# Patient Record
Sex: FEMALE | Race: WHITE | NOT HISPANIC OR LATINO
[De-identification: names, ages, dates, MRNs, and addresses within clinical notes are randomized per-mention and may not be internally consistent; named-entity substitution may affect disease eponyms.]

---

## 2019-02-12 PROBLEM — Z00.00 ENCOUNTER FOR PREVENTIVE HEALTH EXAMINATION: Status: ACTIVE | Noted: 2019-02-12

## 2019-02-22 ENCOUNTER — APPOINTMENT (OUTPATIENT)
Dept: UROGYNECOLOGY | Facility: CLINIC | Age: 74
End: 2019-02-22
Payer: MEDICARE

## 2019-02-22 VITALS
HEIGHT: 61 IN | BODY MASS INDEX: 23.41 KG/M2 | WEIGHT: 124 LBS | DIASTOLIC BLOOD PRESSURE: 78 MMHG | SYSTOLIC BLOOD PRESSURE: 148 MMHG

## 2019-02-22 DIAGNOSIS — R31.29 OTHER MICROSCOPIC HEMATURIA: ICD-10-CM

## 2019-02-22 LAB
BILIRUB UR QL STRIP: NORMAL
CLARITY UR: CLEAR
COLLECTION METHOD: NORMAL
GLUCOSE UR-MCNC: NORMAL
HCG UR QL: 0.2 EU/DL
HGB UR QL STRIP.AUTO: NORMAL
KETONES UR-MCNC: NORMAL
LEUKOCYTE ESTERASE UR QL STRIP: NORMAL
NITRITE UR QL STRIP: NORMAL
PH UR STRIP: 7
PROT UR STRIP-MCNC: NORMAL
SP GR UR STRIP: 1.01

## 2019-02-22 PROCEDURE — 99204 OFFICE O/P NEW MOD 45 MIN: CPT

## 2019-02-22 NOTE — PHYSICAL EXAM
[No Acute Distress] : in no acute distress [Oriented x3] : oriented to person, place, and time [Normal Memory] : ~T memory was ~L unimpaired [Normal Mood/Affect] : mood and affect are normal [Anxiety] : patient is anxious [Warm and Dry] : was warm and dry to touch [Normal Gait] : gait was normal [Vulvar Atrophy] : vulvar atrophy [Labia Majora] : were normal [Labia Minora] : were normal [No Bleeding] : there was no active vaginal bleeding [Aa ____] : Aa [unfilled] [Ba ____] : Ba [unfilled] [Normal] : no abnormalities [C ____] : C [unfilled] [GH ____] : GH [unfilled] [PB ____] : PB [unfilled] [TVL ____] : TVL  [unfilled] [Ap ____] : Ap [unfilled] [Bp ____] : Bp [unfilled] [D ____] : D [unfilled] [Tenderness] : ~T no ~M abdominal tenderness observed [Distended] : not distended [H/Smegaly] : no hepatosplenomegaly [Inguinal LAD] : no adenopathy was noted in the inguinal lymph nodes [FreeTextEntry4] : defect in proximal anterior vaginal wall above, Dr Vera called in to examine patient with me. No enterocele or bowel through defect. No peritoneal defect.

## 2019-02-22 NOTE — DISCUSSION/SUMMARY
[FreeTextEntry1] : \par Computer generated images were used to demonstrate her prolapse as well as explain treatment options. We reviewed management options for her prolapse including: observation, pelvic floor exercises, pessary, and surgical management. She denies bothersome symptoms due to her prolapse and PVR normal. She would like to continue with observation. Will RTO in 1 year for follow up, or sooner if issues arise. All questions answered.

## 2019-02-22 NOTE — HISTORY OF PRESENT ILLNESS
[Vaginal Wall Prolapse] : none [] : years ago [Rectal Prolapse] : none [Unable To Restrain Bowel Movement] : none [Feelings Of Urinary Urgency] : none [Urinary Frequency] : none [Pain During Urination (Dysuria)] : none [Urinary Tract Infection] : none [Constipation Obstructed Defecation] : none [FreeTextEntry5] : no associated pain or discomfort [FreeTextEntry1] : \par h/o abdominal "bladder lift" ~13 years ago, unsure if bhatia procedure.

## 2019-02-22 NOTE — PROCEDURE
[FreeTextEntry1] : Sterile straight catheterization was performed to measure a postvoid residual volume which was 80 cc (15 min after voiding)

## 2019-02-25 ENCOUNTER — RECORD ABSTRACTING (OUTPATIENT)
Age: 74
End: 2019-02-25

## 2019-02-25 DIAGNOSIS — Z81.8 FAMILY HISTORY OF OTHER MENTAL AND BEHAVIORAL DISORDERS: ICD-10-CM

## 2019-02-25 DIAGNOSIS — Z83.3 FAMILY HISTORY OF DIABETES MELLITUS: ICD-10-CM

## 2019-02-25 DIAGNOSIS — Z82.3 FAMILY HISTORY OF STROKE: ICD-10-CM

## 2019-02-25 DIAGNOSIS — I10 ESSENTIAL (PRIMARY) HYPERTENSION: ICD-10-CM

## 2019-02-25 DIAGNOSIS — E07.9 DISORDER OF THYROID, UNSPECIFIED: ICD-10-CM

## 2019-02-25 DIAGNOSIS — Z85.3 PERSONAL HISTORY OF MALIGNANT NEOPLASM OF BREAST: ICD-10-CM

## 2019-02-25 DIAGNOSIS — Z78.9 OTHER SPECIFIED HEALTH STATUS: ICD-10-CM

## 2019-02-25 DIAGNOSIS — Z63.4 DISAPPEARANCE AND DEATH OF FAMILY MEMBER: ICD-10-CM

## 2019-02-25 DIAGNOSIS — Z83.49 FAMILY HISTORY OF OTHER ENDOCRINE, NUTRITIONAL AND METABOLIC DISEASES: ICD-10-CM

## 2019-02-25 RX ORDER — AMLODIPINE BESYLATE 5 MG/1
5 TABLET ORAL
Refills: 0 | Status: ACTIVE | COMMUNITY

## 2019-02-25 RX ORDER — LEVOTHYROXINE SODIUM 75 UG/1
75 TABLET ORAL
Refills: 0 | Status: ACTIVE | COMMUNITY

## 2019-02-25 SDOH — SOCIAL STABILITY - SOCIAL INSECURITY: DISSAPEARANCE AND DEATH OF FAMILY MEMBER: Z63.4

## 2019-02-26 ENCOUNTER — RESULT REVIEW (OUTPATIENT)
Age: 74
End: 2019-02-26

## 2019-03-01 ENCOUNTER — RESULT REVIEW (OUTPATIENT)
Age: 74
End: 2019-03-01

## 2019-03-01 DIAGNOSIS — N39.0 URINARY TRACT INFECTION, SITE NOT SPECIFIED: ICD-10-CM

## 2019-03-01 LAB — BACTERIA UR CULT: ABNORMAL

## 2019-07-14 ENCOUNTER — FORM ENCOUNTER (OUTPATIENT)
Age: 74
End: 2019-07-14

## 2019-07-26 ENCOUNTER — FORM ENCOUNTER (OUTPATIENT)
Age: 74
End: 2019-07-26

## 2019-11-20 LAB
APPEARANCE: CLEAR
BACTERIA: NEGATIVE
BILIRUBIN URINE: NEGATIVE
BLOOD URINE: NEGATIVE
COLOR: NORMAL
GLUCOSE QUALITATIVE U: NEGATIVE
HYALINE CASTS: 0 /LPF
KETONES URINE: NEGATIVE
LEUKOCYTE ESTERASE URINE: NEGATIVE
MICROSCOPIC-UA: NORMAL
NITRITE URINE: NEGATIVE
PH URINE: 7
PROTEIN URINE: NEGATIVE
RED BLOOD CELLS URINE: 1 /HPF
SPECIFIC GRAVITY URINE: 1.01
SQUAMOUS EPITHELIAL CELLS: 2 /HPF
UROBILINOGEN URINE: NORMAL
WHITE BLOOD CELLS URINE: 13 /HPF

## 2020-12-21 PROBLEM — N39.0 ACUTE UTI: Status: RESOLVED | Noted: 2019-03-01 | Resolved: 2020-12-21

## 2021-02-09 ENCOUNTER — APPOINTMENT (OUTPATIENT)
Dept: OTOLARYNGOLOGY | Facility: CLINIC | Age: 76
End: 2021-02-09
Payer: MEDICARE

## 2021-02-09 ENCOUNTER — OUTPATIENT (OUTPATIENT)
Dept: OUTPATIENT SERVICES | Facility: HOSPITAL | Age: 76
LOS: 1 days | Discharge: HOME | End: 2021-02-09

## 2021-02-09 VITALS
HEART RATE: 85 BPM | SYSTOLIC BLOOD PRESSURE: 168 MMHG | RESPIRATION RATE: 14 BRPM | BODY MASS INDEX: 23.6 KG/M2 | TEMPERATURE: 98.2 F | HEIGHT: 61 IN | WEIGHT: 125 LBS | DIASTOLIC BLOOD PRESSURE: 80 MMHG

## 2021-02-09 PROCEDURE — 99204 OFFICE O/P NEW MOD 45 MIN: CPT | Mod: 25

## 2021-02-09 PROCEDURE — 92504 EAR MICROSCOPY EXAMINATION: CPT

## 2021-02-09 PROCEDURE — 99072 ADDL SUPL MATRL&STAF TM PHE: CPT

## 2021-02-09 RX ORDER — SULFAMETHOXAZOLE AND TRIMETHOPRIM 800; 160 MG/1; MG/1
800-160 TABLET ORAL
Qty: 14 | Refills: 0 | Status: DISCONTINUED | COMMUNITY
Start: 2019-03-01 | End: 2021-02-09

## 2021-02-09 RX ORDER — MOMETASONE FUROATE 1 MG/G
0.1 OINTMENT TOPICAL DAILY
Qty: 15 | Refills: 1 | Status: ACTIVE | COMMUNITY
Start: 2021-02-09 | End: 1900-01-01

## 2021-02-11 NOTE — PROCEDURE
[Same] : same as the Pre Op Dx. [] : Binocular Microscopy [FreeTextEntry1] : L EAC lesion [FreeTextEntry4] : none [FreeTextEntry6] : Rigid endoscope with video feedback was used to examine the ear canal, ear drum and visible middle ear landmarks & educate patient. Adequate exam/patient education would not have been possible without the use of an endoscope. Findings are described. Stills taken.\par \par

## 2021-02-11 NOTE — PHYSICAL EXAM
[Binocular Microscopic Exam] : Binocular microscopic exam was performed [Normal] : the right mastoid was normal [Hearing Loss Right Only] : normal [Hearing Loss Left Only] : normal [FreeTextEntry7] : +eczema type changes [FreeTextEntry9] : inferior ulcerated lesion with heaped up borders and dry patches

## 2021-02-11 NOTE — CONSULT LETTER
[Dear  ___] : Dear  [unfilled], [Consult Letter:] : I had the pleasure of evaluating your patient, [unfilled]. [Please see my note below.] : Please see my note below. [Consult Closing:] : Thank you very much for allowing me to participate in the care of this patient.  If you have any questions, please do not hesitate to contact me. [Sincerely,] : Sincerely, [FreeTextEntry2] : Michael Velasquez [FreeTextEntry3] : Martina Alcaraz MD\par Otology, Neurotology and Skull Base Surgery\par

## 2021-02-11 NOTE — HISTORY OF PRESENT ILLNESS
[de-identified] : 74 y/o woman referred by ENT and allergy Dr. Velasquez for Left ear lesion.  Left lateral inferior lesion treated for 8 weeks without resolution.  Concern for SCC, no Bx as of yet.  Pt. denies otalgia, otorrhea,

## 2021-03-02 DIAGNOSIS — H61.92 DISORDER OF LEFT EXTERNAL EAR, UNSPECIFIED: ICD-10-CM

## 2021-03-02 DIAGNOSIS — H60.8X3 OTHER OTITIS EXTERNA, BILATERAL: ICD-10-CM

## 2021-03-24 ENCOUNTER — LABORATORY RESULT (OUTPATIENT)
Age: 76
End: 2021-03-24

## 2021-03-25 ENCOUNTER — APPOINTMENT (OUTPATIENT)
Dept: OTOLARYNGOLOGY | Facility: CLINIC | Age: 76
End: 2021-03-25
Payer: MEDICARE

## 2021-03-25 DIAGNOSIS — H61.92 DISORDER OF LEFT EXTERNAL EAR, UNSPECIFIED: ICD-10-CM

## 2021-03-25 DIAGNOSIS — H60.8X3 OTHER OTITIS EXTERNA, BILATERAL: ICD-10-CM

## 2021-03-25 PROCEDURE — 92504 EAR MICROSCOPY EXAMINATION: CPT

## 2021-03-25 PROCEDURE — 99212 OFFICE O/P EST SF 10 MIN: CPT | Mod: 25

## 2021-03-25 PROCEDURE — 69145 REMOVE EAR CANAL LESION(S): CPT

## 2021-03-25 PROCEDURE — 99072 ADDL SUPL MATRL&STAF TM PHE: CPT

## 2021-03-25 NOTE — HISTORY OF PRESENT ILLNESS
[de-identified] : 76 y/o woman presents for 6 week f/u  L OE and L EAC lateral inferior lesion, possibly eczema vs cutaneous dysplasia.\par Has been using Elocon cream daily.  Pt. also with c/o Right ear fluid/popping sensation.  Started on Flonase by pmd.  Improving now.  Pt. denies otalgia, otorrhea or changes in her hearing.

## 2021-03-25 NOTE — PHYSICAL EXAM
[Binocular Microscopic Exam] : Binocular microscopic exam was performed [Normal] : the left mastoid was normal [Hearing Loss Right Only] : normal [Hearing Loss Left Only] : normal [FreeTextEntry7] : +eczema type changes [FreeTextEntry9] : inferior ulcerated lesion with heaped up borders and dry patches

## 2021-03-25 NOTE — PROCEDURE
[Same] : same as the Pre Op Dx. [] : Binocular Microscopy [FreeTextEntry1] : R EAC lesion [FreeTextEntry4] : 1%lido +1:100.000 epi [FreeTextEntry6] : After consent for procedure, local anesthetic was injected around  the lesion under microscopic guidance. The lesion was excised with a myringotomy blade at its inferior edge. This was sent for specimen in formalin solution. hemostasis was achieved with silver nitrate and surgicel. Ointment was applied. Patient tolerated the procedure well.\par

## 2021-04-20 ENCOUNTER — APPOINTMENT (OUTPATIENT)
Dept: OTOLARYNGOLOGY | Facility: CLINIC | Age: 76
End: 2021-04-20
Payer: MEDICARE

## 2021-04-20 VITALS
WEIGHT: 125 LBS | SYSTOLIC BLOOD PRESSURE: 177 MMHG | HEART RATE: 72 BPM | TEMPERATURE: 98 F | HEIGHT: 61 IN | BODY MASS INDEX: 23.6 KG/M2 | DIASTOLIC BLOOD PRESSURE: 76 MMHG

## 2021-04-20 PROCEDURE — 99214 OFFICE O/P EST MOD 30 MIN: CPT

## 2021-04-20 PROCEDURE — 99072 ADDL SUPL MATRL&STAF TM PHE: CPT

## 2021-04-20 PROCEDURE — ZZZZZ: CPT

## 2021-04-20 PROCEDURE — 99211 OFF/OP EST MAY X REQ PHY/QHP: CPT | Mod: NC

## 2021-04-20 NOTE — ASSESSMENT
[FreeTextEntry1] : Pt to get CT of temporal bones to R/O invasion of underlying bone and cartilage.  Will plan for surgical resection once this is completed.\par \par Pt raised the possibility of deferring surgery.  I explained that the lesion would likely expand and invade surrounding structures over time. We will discuss options further once the CT is done.

## 2021-04-20 NOTE — HISTORY OF PRESENT ILLNESS
[de-identified] : 75 year female referred by Dr. Alcaraz for left ear lesion. Biopsy of left ear 04/12/2021 showed basal cell carcinoma, nodular type. Denies otalgia, otorrhea, ear infections, hearing loss, tinnitus, dizziness, vertigo, headaches related to hearing.

## 2021-04-20 NOTE — PHYSICAL EXAM
[Midline] : trachea located in midline position [Normal] : no rashes [de-identified] : L EAC skin irritation involving EAC floor and anterior wall.   [FreeTextEntry2] : R temple scar

## 2021-04-20 NOTE — CONSULT LETTER
[Consult Letter:] : I had the pleasure of evaluating your patient, [unfilled]. [Please see my note below.] : Please see my note below. [Consult Closing:] : Thank you very much for allowing me to participate in the care of this patient.  If you have any questions, please do not hesitate to contact me. [Sincerely,] : Sincerely, [Dear  ___] : Dear  [unfilled], [DrKrystle  ___] : Dr. MOSELEY [FreeTextEntry2] : Martina Alcaraz MD ([facility] physician)\par  [FreeTextEntry3] : Marcell Treviño MD, FACS\par Chief of Otolaryngology at [facility]\par \par Dept. of Otolaryngology\par [facility]\par

## 2021-04-20 NOTE — REASON FOR VISIT
[Initial Consultation] : an initial consultation for [FreeTextEntry2] : referred by Dr. Alcaraz for left ear lesion.

## 2021-04-24 ENCOUNTER — RESULT REVIEW (OUTPATIENT)
Age: 76
End: 2021-04-24

## 2021-04-27 ENCOUNTER — OUTPATIENT (OUTPATIENT)
Dept: OUTPATIENT SERVICES | Facility: HOSPITAL | Age: 76
LOS: 1 days | End: 2021-04-27
Payer: COMMERCIAL

## 2021-04-27 ENCOUNTER — APPOINTMENT (OUTPATIENT)
Dept: CT IMAGING | Facility: IMAGING CENTER | Age: 76
End: 2021-04-27
Payer: MEDICARE

## 2021-04-27 DIAGNOSIS — C44.201 UNSPECIFIED MALIGNANT NEOPLASM OF SKIN OF UNSPECIFIED EAR AND EXTERNAL AURICULAR CANAL: ICD-10-CM

## 2021-04-27 PROCEDURE — G1004: CPT

## 2021-04-27 PROCEDURE — 70481 CT ORBIT/EAR/FOSSA W/DYE: CPT

## 2021-04-27 PROCEDURE — 70481 CT ORBIT/EAR/FOSSA W/DYE: CPT | Mod: 26,MG

## 2021-04-29 DIAGNOSIS — C44.201 UNSPECIFIED MALIGNANT NEOPLASM OF SKIN OF UNSPECIFIED EAR AND EXTERNAL AURICULAR CANAL: ICD-10-CM

## 2021-06-01 ENCOUNTER — APPOINTMENT (OUTPATIENT)
Dept: OTOLARYNGOLOGY | Facility: CLINIC | Age: 76
End: 2021-06-01
Payer: MEDICARE

## 2021-06-01 VITALS
HEIGHT: 60 IN | SYSTOLIC BLOOD PRESSURE: 159 MMHG | HEART RATE: 86 BPM | WEIGHT: 127 LBS | BODY MASS INDEX: 24.94 KG/M2 | DIASTOLIC BLOOD PRESSURE: 80 MMHG

## 2021-06-01 PROCEDURE — 99024 POSTOP FOLLOW-UP VISIT: CPT

## 2021-06-01 RX ORDER — ASPIRIN 81 MG
81 TABLET, DELAYED RELEASE (ENTERIC COATED) ORAL
Refills: 0 | Status: ACTIVE | COMMUNITY

## 2021-06-01 NOTE — HISTORY OF PRESENT ILLNESS
[de-identified] : 75 year old female follow up here to discuss left ear surgery.  Biopsy 4/20/21 showed basal cell carcinoma.  Since her last visit pt reports that she was seen in ER and was told that she had atrial flutter.  Pt has Cardiology evaluation scheduled.

## 2021-06-01 NOTE — CONSULT LETTER
[Dear  ___] : Dear  [unfilled], [Courtesy Letter:] : I had the pleasure of seeing your patient, [unfilled], in my office today. [Please see my note below.] : Please see my note below. [Sincerely,] : Sincerely, [FreeTextEntry2] : Blue Mendiola MD [FreeTextEntry3] : Marcell Treviño MD, FACS\par Chief of Otolaryngology at [facility]\par \par Dept. of Otolaryngology\par [facility]\par  [DrKrystle  ___] : Dr. MOSELEY

## 2021-06-01 NOTE — PHYSICAL EXAM
[Midline] : trachea located in midline position [Normal] : no rashes [de-identified] : L EAC skin irritation involving EAC floor and anterior wall.   [FreeTextEntry2] : R temple scar

## 2021-06-01 NOTE — REASON FOR VISIT
[Subsequent Evaluation] : a subsequent evaluation for [FreeTextEntry2] : follow up here to discuss left ear surgery

## 2021-06-01 NOTE — ASSESSMENT
[FreeTextEntry1] : Pt to schedule excision of L EAC lesion with possible skin graft and possible rotation flap.

## 2021-06-19 DIAGNOSIS — Z01.818 ENCOUNTER FOR OTHER PREPROCEDURAL EXAMINATION: ICD-10-CM

## 2021-06-20 ENCOUNTER — APPOINTMENT (OUTPATIENT)
Dept: DISASTER EMERGENCY | Facility: CLINIC | Age: 76
End: 2021-06-20

## 2021-06-21 LAB — SARS-COV-2 N GENE NPH QL NAA+PROBE: NOT DETECTED

## 2021-06-22 ENCOUNTER — OUTPATIENT (OUTPATIENT)
Dept: OUTPATIENT SERVICES | Facility: HOSPITAL | Age: 76
LOS: 1 days | End: 2021-06-22

## 2021-06-22 VITALS
SYSTOLIC BLOOD PRESSURE: 150 MMHG | OXYGEN SATURATION: 96 % | WEIGHT: 128.09 LBS | DIASTOLIC BLOOD PRESSURE: 80 MMHG | HEART RATE: 90 BPM | TEMPERATURE: 98 F | RESPIRATION RATE: 16 BRPM | HEIGHT: 60 IN

## 2021-06-22 VITALS
DIASTOLIC BLOOD PRESSURE: 80 MMHG | SYSTOLIC BLOOD PRESSURE: 150 MMHG | HEIGHT: 60 IN | OXYGEN SATURATION: 96 % | WEIGHT: 128.09 LBS | RESPIRATION RATE: 16 BRPM | HEART RATE: 90 BPM | TEMPERATURE: 98 F

## 2021-06-22 DIAGNOSIS — E03.9 HYPOTHYROIDISM, UNSPECIFIED: ICD-10-CM

## 2021-06-22 DIAGNOSIS — C44.201 UNSPECIFIED MALIGNANT NEOPLASM OF SKIN OF UNSPECIFIED EAR AND EXTERNAL AURICULAR CANAL: ICD-10-CM
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
## 2021-06-22 DIAGNOSIS — N81.4 UTEROVAGINAL PROLAPSE, UNSPECIFIED: Chronic | ICD-10-CM

## 2021-06-22 DIAGNOSIS — Z98.890 OTHER SPECIFIED POSTPROCEDURAL STATES: Chronic | ICD-10-CM

## 2021-06-22 DIAGNOSIS — I10 ESSENTIAL (PRIMARY) HYPERTENSION: ICD-10-CM

## 2021-06-22 DIAGNOSIS — C80.1 MALIGNANT (PRIMARY) NEOPLASM, UNSPECIFIED: ICD-10-CM

## 2021-06-22 DIAGNOSIS — I25.10 ATHEROSCLEROTIC HEART DISEASE OF NATIVE CORONARY ARTERY WITHOUT ANGINA PECTORIS: Chronic | ICD-10-CM

## 2021-06-22 DIAGNOSIS — T78.40XA ALLERGY, UNSPECIFIED, INITIAL ENCOUNTER: ICD-10-CM

## 2021-06-22 LAB
ALBUMIN SERPL ELPH-MCNC: 4.4 G/DL — SIGNIFICANT CHANGE UP (ref 3.3–5)
ALP SERPL-CCNC: 84 U/L — SIGNIFICANT CHANGE UP (ref 40–120)
ALT FLD-CCNC: 13 U/L — SIGNIFICANT CHANGE UP (ref 4–33)
ANION GAP SERPL CALC-SCNC: 13 MMOL/L — SIGNIFICANT CHANGE UP (ref 7–14)
AST SERPL-CCNC: 18 U/L — SIGNIFICANT CHANGE UP (ref 4–32)
BILIRUB SERPL-MCNC: 0.3 MG/DL — SIGNIFICANT CHANGE UP (ref 0.2–1.2)
BLD GP AB SCN SERPL QL: NEGATIVE — SIGNIFICANT CHANGE UP
BUN SERPL-MCNC: 14 MG/DL — SIGNIFICANT CHANGE UP (ref 7–23)
CALCIUM SERPL-MCNC: 10.1 MG/DL — SIGNIFICANT CHANGE UP (ref 8.4–10.5)
CHLORIDE SERPL-SCNC: 103 MMOL/L — SIGNIFICANT CHANGE UP (ref 98–107)
CO2 SERPL-SCNC: 25 MMOL/L — SIGNIFICANT CHANGE UP (ref 22–31)
CREAT SERPL-MCNC: 0.55 MG/DL — SIGNIFICANT CHANGE UP (ref 0.5–1.3)
GLUCOSE SERPL-MCNC: 102 MG/DL — HIGH (ref 70–99)
HCT VFR BLD CALC: 43.1 % — SIGNIFICANT CHANGE UP (ref 34.5–45)
HGB BLD-MCNC: 13.7 G/DL — SIGNIFICANT CHANGE UP (ref 11.5–15.5)
MCHC RBC-ENTMCNC: 29.7 PG — SIGNIFICANT CHANGE UP (ref 27–34)
MCHC RBC-ENTMCNC: 31.8 GM/DL — LOW (ref 32–36)
MCV RBC AUTO: 93.5 FL — SIGNIFICANT CHANGE UP (ref 80–100)
NRBC # BLD: 0 /100 WBCS — SIGNIFICANT CHANGE UP
NRBC # FLD: 0 K/UL — SIGNIFICANT CHANGE UP
PLATELET # BLD AUTO: 275 K/UL — SIGNIFICANT CHANGE UP (ref 150–400)
POTASSIUM SERPL-MCNC: 3.6 MMOL/L — SIGNIFICANT CHANGE UP (ref 3.5–5.3)
POTASSIUM SERPL-SCNC: 3.6 MMOL/L — SIGNIFICANT CHANGE UP (ref 3.5–5.3)
PROT SERPL-MCNC: 7.6 G/DL — SIGNIFICANT CHANGE UP (ref 6–8.3)
RBC # BLD: 4.61 M/UL — SIGNIFICANT CHANGE UP (ref 3.8–5.2)
RBC # FLD: 13.8 % — SIGNIFICANT CHANGE UP (ref 10.3–14.5)
RH IG SCN BLD-IMP: POSITIVE — SIGNIFICANT CHANGE UP
SODIUM SERPL-SCNC: 141 MMOL/L — SIGNIFICANT CHANGE UP (ref 135–145)
WBC # BLD: 8.1 K/UL — SIGNIFICANT CHANGE UP (ref 3.8–10.5)
WBC # FLD AUTO: 8.1 K/UL — SIGNIFICANT CHANGE UP (ref 3.8–10.5)

## 2021-06-22 RX ORDER — SODIUM CHLORIDE 9 MG/ML
1000 INJECTION, SOLUTION INTRAVENOUS
Refills: 0 | Status: DISCONTINUED | OUTPATIENT
Start: 2021-06-23 | End: 2021-07-07

## 2021-06-22 NOTE — H&P PST ADULT - NEGATIVE NEUROLOGICAL SYMPTOMS
no transient paralysis/no weakness/no paresthesias/no generalized seizures/no focal seizures/no syncope
no transient paralysis/no weakness/no paresthesias/no generalized seizures/no focal seizures/no syncope/no tremors

## 2021-06-22 NOTE — H&P PST ADULT - NSICDXPASTMEDICALHX_GEN_ALL_CORE_FT
PAST MEDICAL HISTORY:  Breast cancer, left 2000 ; tx surgery and rt    Fall per pt 3 weeks pta ; pt sustained fall  ; pt referred to cardiology for evaluation    Hypertension     Hypothyroidism     Shingles 3 years ago ; pt states left thigh ; pt reports residual left leg pain

## 2021-06-22 NOTE — H&P PST ADULT - NEGATIVE GENERAL SYMPTOMS
no fever/no chills/no sweating/no anorexia/no weight loss/no weight gain
no fever/no chills/no sweating/no anorexia/no weight loss/no weight gain

## 2021-06-22 NOTE — H&P PST ADULT - ENDOCRINE COMMENTS
Hypothyroidism tx synthroid ; per pt monitored by Dr Mendiola , pt denies h/o diabetes
Hypothyroidism ; rx Synthroid monitored by Dr Mendiola as per pt ; pt denies h/o diabetes

## 2021-06-22 NOTE — H&P PST ADULT - NSICDXFAMILYHX_GEN_ALL_CORE_FT
FAMILY HISTORY:  Sibling  Still living? No  FH: diabetes mellitus, Age at diagnosis: Age Unknown    
FAMILY HISTORY:  Sibling  Still living? No  FH: diabetes mellitus, Age at diagnosis: Age Unknown

## 2021-06-22 NOTE — H&P PST ADULT - RS GEN PE MLT RESP DETAILS PC
airway patent/breath sounds equal/good air movement/respirations non-labored/clear to auscultation bilaterally
airway patent/breath sounds equal/good air movement/respirations non-labored/clear to auscultation bilaterally

## 2021-06-22 NOTE — H&P PST ADULT - DENTITION
Right and left missing teeth ; pt denies  loose teeth
Right and left upper missing teeth ; pt denies loose teeth

## 2021-06-22 NOTE — H&P PST ADULT - NEGATIVE OPHTHALMOLOGIC SYMPTOMS
no diplopia/no photophobia/no blurred vision L/no blurred vision R
no diplopia/no photophobia/no blurred vision L/no blurred vision R

## 2021-06-22 NOTE — H&P PST ADULT - NSANTHOSAYNRD_GEN_A_CORE
No. OWEN screening performed.  STOP BANG Legend: 0-2 = LOW Risk; 3-4 = INTERMEDIATE Risk; 5-8 = HIGH Risk
No. OWEN screening performed.  STOP BANG Legend: 0-2 = LOW Risk; 3-4 = INTERMEDIATE Risk; 5-8 = HIGH Risk

## 2021-06-22 NOTE — H&P PST ADULT - NEGATIVE BREAST SYMPTOMS
no breast tenderness L/no breast tenderness R/no nipple discharge L/no nipple discharge R
no breast tenderness L/no breast tenderness R/no nipple discharge L/no nipple discharge R

## 2021-06-22 NOTE — H&P PST ADULT - NSICDXPASTSURGICALHX_GEN_ALL_CORE_FT
PAST SURGICAL HISTORY:  CAD (coronary artery disease)      (normal spontaneous vaginal delivery) x3    Prolapsed uterus Per pt tx surgically    S/P lumpectomy, left breast 2000    
PAST SURGICAL HISTORY:  CAD (coronary artery disease)      (normal spontaneous vaginal delivery) x3    Prolapsed uterus Per pt tx surgically    S/P lumpectomy, left breast 2000

## 2021-06-22 NOTE — H&P PST ADULT - MUSCULOSKELETAL COMMENTS
Pt reports left leg pain x 3 years  ; pt with h/o shingles > 3 years ago
Age appropriate ; pt ambulates with cane . Pt states does not shower sponge baths due to fear of fall

## 2021-06-22 NOTE — H&P PST ADULT - NSICDXPROBLEM_GEN_ALL_CORE_FT
PROBLEM DIAGNOSES  Problem: Malignant neoplasm  Assessment and Plan: Resection of Left Ear Canal Tumor Possible Local Flap Reconstruction Possible skin Graft  Pre op instructions reviewed with pt ; pt verbalized good understanding of pre op instructions  Pt to Dr Mendiola for pre op medical evaluation  Pt to Dr Aguirre for pre op cardiac evaluation     Problem: Allergy  Assessment and Plan: PCN, Latex, Band Aids  OR Booking notified via fax     Problem: HTN (hypertension)  Assessment and Plan: Pt to take Amlodipine evening prior to surgery as per usual     Problem: Hypothyroidism  Assessment and Plan: Pt to take Synthroid dos     Problem: Fall  Assessment and Plan: Pt s/p fall > 3 weeks ago ; pt referred by ER @ [facility]  for cardiac evaluation Dr Aguirre ; EKG and Holter done   Request cardiac evaluation , EKG & recent studies  Call to surgeons office s/w julienne to make surgeon aware          PROBLEM DIAGNOSES  Problem: Malignant neoplasm  Assessment and Plan: Resection of Left Ear Canal Tumor Possible Local Flap Reconstruction Possible skin Graft  Pre op instructions reviewed with pt ; pt verbalized good understanding of pre op instructions  Pt to Dr Mendiola for pre op medical evaluation  Pt to Dr Aguirre for pre op cardiac evaluation     Problem: Allergy  Assessment and Plan: PCN, Latex, Band Aids  OR Booking notified via fax     Problem: HTN (hypertension)  Assessment and Plan: Pt to take Amlodipine evening prior to surgery as per usual     Problem: Hypothyroidism  Assessment and Plan: Pt to take Synthroid dos     Problem: Fall  Assessment and Plan: Pt s/p fall 5/2021 ; pt referred by ER @ [facility]  for cardiac evaluation Dr Aguirre ; EKG and Holter done   Request cardiac evaluation , EKG & recent studies  Call to surgeons office s/w Luisa aware awaiting medical and cardiac evaluation

## 2021-06-22 NOTE — ASU PATIENT PROFILE, ADULT - PSH
CAD (coronary artery disease)     (normal spontaneous vaginal delivery)  x3  Prolapsed uterus  Per pt tx surgically  S/P lumpectomy, left breast  2000

## 2021-06-22 NOTE — ASU PATIENT PROFILE, ADULT - PMH
Breast cancer, left  2000 ; tx surgery and rt  Fall  Per pt 5/2021 ;  ; pt sustained fall  ; pt referred to cardiology for  atrial "flutter"  Hypertension    Hypothyroidism    Shingles  3 years ago ; pt states left thigh ; pt reports residual left leg pain

## 2021-06-22 NOTE — H&P PST ADULT - NSICDXPROBLEM_GEN_ALL_CORE_FT
PROBLEM DIAGNOSES  Problem: Malignant neoplasm  Assessment and Plan: Resection of Left Ear Canal Tumor Possible Local Flap Reconstruction Possible skin Graft  Pre op instructions reviewed with pt ; pt verbalized good understanding of pre op instructions  Pt to Dr Mendiola for pre op medical evaluation  Pt to Dr Aguirre for pre op cardiac evaluation     Problem: Allergy  Assessment and Plan: PCN, Latex, Band Aids  OR Booking notified via fax     Problem: HTN (hypertension)  Assessment and Plan: Pt to take Amlodipine evening prior to surgery as per usual     Problem: Hypothyroidism  Assessment and Plan: Pt to take Synthroid dos     Problem: Fall  Assessment and Plan: Pt s/p fall > 3 weeks ago ; pt referred for cardiac evaluation Dr Aguirre        PROBLEM DIAGNOSES  Problem: Malignant neoplasm  Assessment and Plan: Resection of Left Ear Canal Tumor Possible Local Flap Reconstruction Possible skin Graft  Pre op instructions reviewed with pt ; pt verbalized good understanding of pre op instructions  Pt to Dr Mendiola for pre op medical evaluation  Pt to Dr Aguirre for pre op cardiac evaluation     Problem: Allergy  Assessment and Plan: PCN, Latex, Band Aids  OR Booking notified via fax     Problem: HTN (hypertension)  Assessment and Plan: Pt to take Amlodipine evening prior to surgery as per usual     Problem: Hypothyroidism  Assessment and Plan: Pt to take Synthroid dos     Problem: Fall  Assessment and Plan: Pt s/p fall > 3 weeks ago ; pt referred by ER @ [facility]  for cardiac evaluation Dr Aguirre ; EKG and Holter done

## 2021-06-22 NOTE — H&P PST ADULT - HISTORY OF PRESENT ILLNESS
Pt is a 75 y.o. female ; h/o lesion left ear ;pt to surgeon  pt s/p biopsy " it is basal cell " Pt  now presents for Resection of Left ear Canal Tumor Possible Local Flap Reconstruction Possible Skin Graft 
Pt is a 75 y.o. female ; h/o lesion left ear ; first noted 2020; pt to surgeon ; s/p biopsy "It is basal cell cancer "  Pt now presents for Resection of Left Ear Canal Tumor Possible Local Flap Reconstruction Possible skin Graft

## 2021-06-22 NOTE — H&P PST ADULT - LYMPHATIC
posterior cervical L/posterior cervical R/anterior cervical L/anterior cervical R/supraclavicular L/supraclavicular R
posterior cervical L/posterior cervical R/anterior cervical L/anterior cervical R/supraclavicular L/supraclavicular R

## 2021-06-22 NOTE — H&P PST ADULT - NEGATIVE CARDIOVASCULAR SYMPTOMS
no chest pain/no palpitations/no dyspnea on exertion
no chest pain/no palpitations/no dyspnea on exertion

## 2021-06-22 NOTE — H&P PST ADULT - NSICDXPASTMEDICALHX_GEN_ALL_CORE_FT
PAST MEDICAL HISTORY:  Breast cancer, left 2000 ; tx surgery and rt    Fall Per pt 3 weeks pta ; pt sustained fall  ; pt referred to cardiology for evaluation    Hypertension     Hypothyroidism     Shingles 3 years ago ; pt states left thigh ; pt reports residual left leg pain     PAST MEDICAL HISTORY:  Breast cancer, left 2000 ; tx surgery and rt    Fall Per pt 5/2021 ;  ; pt sustained fall  ; pt referred to cardiology for  atrial "flutter"    Hypertension     Hypothyroidism     Shingles 3 years ago ; pt states left thigh ; pt reports residual left leg pain

## 2021-06-23 ENCOUNTER — APPOINTMENT (OUTPATIENT)
Dept: OTOLARYNGOLOGY | Facility: HOSPITAL | Age: 76
End: 2021-06-23

## 2021-06-23 ENCOUNTER — OUTPATIENT (OUTPATIENT)
Dept: OUTPATIENT SERVICES | Facility: HOSPITAL | Age: 76
LOS: 1 days | Discharge: HOME | End: 2021-06-23
Payer: MEDICARE

## 2021-06-23 ENCOUNTER — RESULT REVIEW (OUTPATIENT)
Age: 76
End: 2021-06-23

## 2021-06-23 VITALS
HEIGHT: 60 IN | WEIGHT: 128.09 LBS | SYSTOLIC BLOOD PRESSURE: 159 MMHG | RESPIRATION RATE: 16 BRPM | TEMPERATURE: 98 F | HEART RATE: 101 BPM | OXYGEN SATURATION: 96 % | DIASTOLIC BLOOD PRESSURE: 80 MMHG

## 2021-06-23 VITALS
HEART RATE: 84 BPM | SYSTOLIC BLOOD PRESSURE: 113 MMHG | RESPIRATION RATE: 17 BRPM | DIASTOLIC BLOOD PRESSURE: 94 MMHG | OXYGEN SATURATION: 97 %

## 2021-06-23 DIAGNOSIS — Z98.890 OTHER SPECIFIED POSTPROCEDURAL STATES: Chronic | ICD-10-CM

## 2021-06-23 DIAGNOSIS — N81.4 UTEROVAGINAL PROLAPSE, UNSPECIFIED: Chronic | ICD-10-CM

## 2021-06-23 DIAGNOSIS — C44.201 UNSPECIFIED MALIGNANT NEOPLASM OF SKIN OF UNSPECIFIED EAR AND EXTERNAL AURICULAR CANAL: ICD-10-CM

## 2021-06-23 DIAGNOSIS — I25.10 ATHEROSCLEROTIC HEART DISEASE OF NATIVE CORONARY ARTERY WITHOUT ANGINA PECTORIS: Chronic | ICD-10-CM

## 2021-06-23 PROCEDURE — 88331 PATH CONSLTJ SURG 1 BLK 1SPC: CPT | Mod: 26

## 2021-06-23 PROCEDURE — 69150 EXTENSIVE EAR CANAL SURGERY: CPT | Mod: GC,LT,58

## 2021-06-23 PROCEDURE — 15260 FTH/GFT FR N/E/E/L 20 SQCM/<: CPT | Mod: GC,LT,58

## 2021-06-23 PROCEDURE — 88305 TISSUE EXAM BY PATHOLOGIST: CPT | Mod: 26

## 2021-06-23 RX ORDER — CHOLECALCIFEROL (VITAMIN D3) 125 MCG
1 CAPSULE ORAL
Qty: 0 | Refills: 0 | DISCHARGE

## 2021-06-23 RX ORDER — FENTANYL CITRATE 50 UG/ML
25 INJECTION INTRAVENOUS
Refills: 0 | Status: DISCONTINUED | OUTPATIENT
Start: 2021-06-23 | End: 2021-06-23

## 2021-06-23 RX ORDER — SODIUM CHLORIDE 9 MG/ML
1000 INJECTION, SOLUTION INTRAVENOUS
Refills: 0 | Status: DISCONTINUED | OUTPATIENT
Start: 2021-06-23 | End: 2021-07-07

## 2021-06-23 RX ORDER — LEVOTHYROXINE SODIUM 125 MCG
1 TABLET ORAL
Qty: 0 | Refills: 0 | DISCHARGE

## 2021-06-23 RX ORDER — METOCLOPRAMIDE HCL 10 MG
10 TABLET ORAL ONCE
Refills: 0 | Status: COMPLETED | OUTPATIENT
Start: 2021-06-23 | End: 2021-06-23

## 2021-06-23 RX ORDER — AMLODIPINE BESYLATE 2.5 MG/1
1 TABLET ORAL
Qty: 0 | Refills: 0 | DISCHARGE

## 2021-06-23 RX ORDER — ONDANSETRON 8 MG/1
4 TABLET, FILM COATED ORAL ONCE
Refills: 0 | Status: COMPLETED | OUTPATIENT
Start: 2021-06-23 | End: 2021-06-23

## 2021-06-23 RX ORDER — MULTIVIT-MIN/FERROUS GLUCONATE 9 MG/15 ML
1 LIQUID (ML) ORAL
Qty: 0 | Refills: 0 | DISCHARGE

## 2021-06-23 RX ORDER — OXYCODONE HYDROCHLORIDE 5 MG/1
1 TABLET ORAL
Qty: 8 | Refills: 0
Start: 2021-06-23 | End: 2021-06-24

## 2021-06-23 RX ORDER — SODIUM CHLORIDE 9 MG/ML
500 INJECTION, SOLUTION INTRAVENOUS ONCE
Refills: 0 | Status: COMPLETED | OUTPATIENT
Start: 2021-06-23 | End: 2021-06-23

## 2021-06-23 RX ORDER — HYDROMORPHONE HYDROCHLORIDE 2 MG/ML
0.5 INJECTION INTRAMUSCULAR; INTRAVENOUS; SUBCUTANEOUS
Refills: 0 | Status: DISCONTINUED | OUTPATIENT
Start: 2021-06-23 | End: 2021-06-23

## 2021-06-23 RX ADMIN — Medication 200 MILLIGRAM(S): at 14:42

## 2021-06-23 RX ADMIN — SODIUM CHLORIDE 1000 MILLILITER(S): 9 INJECTION, SOLUTION INTRAVENOUS at 12:39

## 2021-06-23 RX ADMIN — ONDANSETRON 4 MILLIGRAM(S): 8 TABLET, FILM COATED ORAL at 12:10

## 2021-06-23 RX ADMIN — Medication 10 MILLIGRAM(S): at 12:38

## 2021-06-23 RX ADMIN — SODIUM CHLORIDE 30 MILLILITER(S): 9 INJECTION, SOLUTION INTRAVENOUS at 07:52

## 2021-06-23 NOTE — ASU DISCHARGE PLAN (ADULT/PEDIATRIC) - ASU DC SPECIAL INSTRUCTIONSFT
You have packing in the left ear. Leave this in place. We will remove in clinic    Take Tylenol and ibuprofen (motrin, advil) alternating every 3 hours for pain control (i.e tylenol at 12, ibuprofen at 2, tylenol at 6 etc..) as needed. Narcotic pain medicine was sent to your pharmacy (oxycodone). Only use this if absolutely necessary. When you take the narcotic pain medicine it may in increase chance of falling, please be careful    Please take your antibiotics - doxycyline as prescribed.     Follow up in 1 week with Dr. Treviño. Please call the office for appointment details and timing

## 2021-06-23 NOTE — ASU DISCHARGE PLAN (ADULT/PEDIATRIC) - CALL YOUR DOCTOR IF YOU HAVE ANY OF THE FOLLOWING:
Bleeding that does not stop/Pain not relieved by Medications/Wound/Surgical Site with redness, or foul smelling discharge or pus/Unable to urinate

## 2021-06-25 ENCOUNTER — NON-APPOINTMENT (OUTPATIENT)
Age: 76
End: 2021-06-25

## 2021-06-28 PROBLEM — C50.912 MALIGNANT NEOPLASM OF UNSPECIFIED SITE OF LEFT FEMALE BREAST: Chronic | Status: ACTIVE | Noted: 2021-06-22

## 2021-06-28 PROBLEM — I10 ESSENTIAL (PRIMARY) HYPERTENSION: Chronic | Status: ACTIVE | Noted: 2021-06-22
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
## 2021-06-28 PROBLEM — E03.9 HYPOTHYROIDISM, UNSPECIFIED: Chronic | Status: ACTIVE | Noted: 2021-06-22

## 2021-06-28 PROBLEM — B02.9 ZOSTER WITHOUT COMPLICATIONS: Chronic | Status: ACTIVE | Noted: 2021-06-22

## 2021-06-29 ENCOUNTER — APPOINTMENT (OUTPATIENT)
Dept: OTOLARYNGOLOGY | Facility: CLINIC | Age: 76
End: 2021-06-29
Payer: MEDICARE

## 2021-06-29 VITALS
HEIGHT: 60 IN | WEIGHT: 127 LBS | DIASTOLIC BLOOD PRESSURE: 72 MMHG | SYSTOLIC BLOOD PRESSURE: 140 MMHG | HEART RATE: 81 BPM | BODY MASS INDEX: 24.94 KG/M2

## 2021-06-29 PROCEDURE — 99024 POSTOP FOLLOW-UP VISIT: CPT

## 2021-06-29 NOTE — HISTORY OF PRESENT ILLNESS
[de-identified] : 75 year old female follow up s/p resection of Left ear canal tumor 6/23/21\par possible local flap reconstruction with possible skin graft\par History of basal cell cancer of ear canal\par Reports mild pain with minimal bleeding\par Denies headaches, dizziness, discharge/drainage and recent fevers

## 2021-06-29 NOTE — ASSESSMENT
[FreeTextEntry1] : Path pending.  Pt to f/u in a week for postauricular suture removal and EAC debridement.

## 2021-06-29 NOTE — CONSULT LETTER
[Dear  ___] : Dear  [unfilled], [Courtesy Letter:] : I had the pleasure of seeing your patient, [unfilled], in my office today. [Please see my note below.] : Please see my note below. [Consult Closing:] : Thank you very much for allowing me to participate in the care of this patient.  If you have any questions, please do not hesitate to contact me. [Sincerely,] : Sincerely, [FreeTextEntry2] : Blue Mendiola, DO [FreeTextEntry3] : Marcell Treviño MD, FACS\par Chief of Otolaryngology at [facility]\par \par Dept. of Otolaryngology\par [facility]

## 2021-06-29 NOTE — PHYSICAL EXAM
[de-identified] : L EAC bolster dssg in place.  Suture D/C'd.  EAC pack left in place.   [Midline] : trachea located in midline position [Normal] : no rashes [FreeTextEntry2] : R temple scar

## 2021-06-29 NOTE — REASON FOR VISIT
[Post-Operative Visit] : a post-operative visit [FreeTextEntry2] : follow up s/p resection of Left ear canal tumor

## 2021-06-30 LAB — SURGICAL PATHOLOGY STUDY: SIGNIFICANT CHANGE UP

## 2021-07-06 ENCOUNTER — APPOINTMENT (OUTPATIENT)
Dept: OTOLARYNGOLOGY | Facility: CLINIC | Age: 76
End: 2021-07-06
Payer: MEDICARE

## 2021-07-06 VITALS
HEIGHT: 60 IN | HEART RATE: 66 BPM | WEIGHT: 127 LBS | BODY MASS INDEX: 24.94 KG/M2 | DIASTOLIC BLOOD PRESSURE: 72 MMHG | SYSTOLIC BLOOD PRESSURE: 161 MMHG

## 2021-07-06 PROCEDURE — 99024 POSTOP FOLLOW-UP VISIT: CPT

## 2021-07-06 NOTE — DATA REVIEWED
[de-identified] : \par  Pathology             Final\par \par No Documents Attached\par \par \par \par \par   [facility] Accession Number : [id number]\par \par MORENA NICHOLSON                    2\par \par \par \par Surgical Final Report\par \par \par \par \par Final Diagnosis\par 1. Left ear canal, excision\par - Scar\par \par 2. Left ear canal, posterior margin, excision\par - Scar\par \par Verified by: Elen Blochin, MD\par (Electronic Signature)\par Reported on: 06/30/21 20:34 EDT, [address], [address], [place] [address]\par Phone: [phone]   Fax: [phone]\par _________________________________________________________________\par \par Intraoperative Consultation\par 1. Left ear canal mass\par -Anterior margin negative for carcinoma\par By Dr. Lazar\par \par 2. Left ear canal, posterior margin\par -Negative for carcinoma, minute fragment\par By Dr. Lazar\par \par Specimen(s) Submitted\par 1-Left ear canal mass\par 2-Left ear canal, posterior margin\par \par Gross Description\par 1.  The specimen is received fresh for intraoperative\par consultation and the specimen container is labeled: Left ear\par canal mass. It consists of a 1.0 x 0.5 x 0.2 cm strip of pink-\par white tissue which is oriented intraoperatively. One end of the\par specimen is designated as anterior and is inked black and the\par opposing end is designated posterior and inked blue. There is a\par 0.4 x 0.4 cm brown, ill-defined mass located less than 0.1 cm\par from the posterior margin and 0.6 cm from the anterior margin.\par The specimen is bisected and half is submitted for frozen\par section. The frozen section cassette is opened (1FSA), verifying\par the presence of tissue. The frozen section remnant is entirely\par submitted for paraffin processing in one cassette. The remaining\par tissue is submitted entirely for paraffin processing in one\par cassette.\par \par 2.  The specimen is received fresh for intraoperative\par consultation and the specimen container is labeled: Left ear\par canal, posterior margin. It consists of a 0.4 x 0.1 x 0.1 cm\par portion of tan-red soft tissue which is entirely submitted for\par frozen section.The frozen section cassette is opened (2FSA),\par \par \par \par \par \par MORENA NICHOLSON                    2\par \par \par \par Surgical Final Report\par \par \par \par \par verifying the presence of tissue. The frozen section remnant is\par entirely submitted for paraffin processing in one cassette.\par \par In addition to other data that may appear on the specimen\par containers, all labels have been inspected to confirm the\par presence of the patient's name and date of birth.\par RADHA Tim ([facility]) 06/23/2021 15:52\par \par Disclaimer\par Histological Processing Performed at [facility], Department of Pathology, [address] [place] [address].\par \par Frozen section Performed at [facility],\par Department of Pathology, [address] [place] [address]\par \par \par Surgical Consult Report\par \par \par \par \par Disclaimer\par Histological Processing Performed at [facility], Department of Pathology, [address] [place] [address].\par \par Frozen section Performed at [facility],\par Department of Pathology, [address] [place] [address]\par \par  \par \par  Ordered by: ARIA STREET       Collected/Examined: 23Jun2021 01:20PM       \par Verification Required       Stage: Final       \par  Performed at: [facility]       Resulted: 30Jun2021 08:34PM       Last Updated: 30Jun2021 08:34PM       Accession: [id number]

## 2021-07-06 NOTE — REASON FOR VISIT
[Subsequent Evaluation] : a subsequent evaluation for [FreeTextEntry2] :  follow up s/p resection of Left ear canal tumor 6/23/21

## 2021-07-06 NOTE — PHYSICAL EXAM
[Midline] : trachea located in midline position [Normal] : no rashes [de-identified] : L EAC bolster dssg removed.  STSG in place.   [FreeTextEntry2] : R temple scar

## 2021-07-06 NOTE — CONSULT LETTER
[Dear  ___] : Dear  [unfilled], [Courtesy Letter:] : I had the pleasure of seeing your patient, [unfilled], in my office today. [Please see my note below.] : Please see my note below. [Sincerely,] : Sincerely, [FreeTextEntry2] : Blue Mendiola MD [FreeTextEntry3] : Marcell Treviño MD, FACS\par Chief of Otolaryngology at [facility]\par \par Dept. of Otolaryngology\par [facility]\par

## 2021-07-06 NOTE — ASSESSMENT
[FreeTextEntry1] : Path d/w pt.  No further treatment warranted at this time.  Pt to f/u in a week for a recheck.

## 2021-07-13 ENCOUNTER — APPOINTMENT (OUTPATIENT)
Dept: OTOLARYNGOLOGY | Facility: CLINIC | Age: 76
End: 2021-07-13
Payer: MEDICARE

## 2021-07-13 ENCOUNTER — APPOINTMENT (OUTPATIENT)
Dept: OTOLARYNGOLOGY | Facility: CLINIC | Age: 76
End: 2021-07-13

## 2021-07-13 VITALS
SYSTOLIC BLOOD PRESSURE: 152 MMHG | HEART RATE: 61 BPM | BODY MASS INDEX: 24.54 KG/M2 | WEIGHT: 125 LBS | DIASTOLIC BLOOD PRESSURE: 72 MMHG | HEIGHT: 60 IN

## 2021-07-13 PROCEDURE — 99024 POSTOP FOLLOW-UP VISIT: CPT

## 2021-07-13 RX ORDER — DOXYCLYCLINE HYCLATE 150 MG/1
TABLET, COATED ORAL
Refills: 0 | Status: DISCONTINUED | COMMUNITY
End: 2021-07-13

## 2021-07-13 NOTE — HISTORY OF PRESENT ILLNESS
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

## 2021-07-13 NOTE — REASON FOR VISIT
[Post-Operative Visit] : a post-operative visit [Family Member] : family member [FreeTextEntry2] : follow up for ear tumor

## 2021-07-13 NOTE — PHYSICAL EXAM
[Normal] : tympanic membranes are normal in both ears [de-identified] : L EAC skin graft intact.  Debris was suctioned from the L EAC.  A small amt of granulation is present at the edge of the graft.

## 2021-07-13 NOTE — CONSULT LETTER
[Dear  ___] : Dear  [unfilled], [Please see my note below.] : Please see my note below. [Consult Closing:] : Thank you very much for allowing me to participate in the care of this patient.  If you have any questions, please do not hesitate to contact me. [Sincerely,] : Sincerely, [Consult Letter:] : I had the pleasure of evaluating your patient, [unfilled]. [FreeTextEntry2] : Blue Mendiola, DO [FreeTextEntry3] : Marcell Treviño MD, FACS\par Chief of Otolaryngology at [facility]\par \par Dept. of Otolaryngology\par [facility]

## 2021-07-15 ENCOUNTER — NON-APPOINTMENT (OUTPATIENT)
Age: 76
End: 2021-07-15

## 2021-07-27 ENCOUNTER — APPOINTMENT (OUTPATIENT)
Dept: OTOLARYNGOLOGY | Facility: CLINIC | Age: 76
End: 2021-07-27
Payer: MEDICARE

## 2021-07-27 VITALS
WEIGHT: 125 LBS | HEART RATE: 77 BPM | BODY MASS INDEX: 24.54 KG/M2 | DIASTOLIC BLOOD PRESSURE: 71 MMHG | HEIGHT: 60 IN | SYSTOLIC BLOOD PRESSURE: 158 MMHG

## 2021-07-27 PROCEDURE — 99024 POSTOP FOLLOW-UP VISIT: CPT

## 2021-07-27 NOTE — PHYSICAL EXAM
[de-identified] : L EAC fully healed.  No evidence of recuurrent tumor seen. [Normal] : external appearance is normal

## 2021-07-27 NOTE — CONSULT LETTER
[Dear  ___] : Dear  [unfilled], [Courtesy Letter:] : I had the pleasure of seeing your patient, [unfilled], in my office today. [Please see my note below.] : Please see my note below. [Sincerely,] : Sincerely, [FreeTextEntry2] : Blue Mendiola, DO \par  [FreeTextEntry3] : Marcell Treviño MD, FACS\par Chief of Otolaryngology at [facility]\par \par Dept. of Otolaryngology\par [facility]\par

## 2021-07-27 NOTE — HISTORY OF PRESENT ILLNESS
[de-identified] : 75 year old female follow up s/p Resection of left external auditory canal lesion with full-thickness skin graft reconstruction 6/23/21.  States has been using the ear drops as prescribed.  Denies fevers, bleeding, pain or drainage.

## 2021-11-16 ENCOUNTER — APPOINTMENT (OUTPATIENT)
Dept: OTOLARYNGOLOGY | Facility: CLINIC | Age: 76
End: 2021-11-16
Payer: MEDICARE

## 2021-11-16 VITALS
HEIGHT: 60 IN | HEART RATE: 76 BPM | DIASTOLIC BLOOD PRESSURE: 74 MMHG | BODY MASS INDEX: 24.54 KG/M2 | SYSTOLIC BLOOD PRESSURE: 144 MMHG | WEIGHT: 125 LBS

## 2021-11-16 PROCEDURE — 99213 OFFICE O/P EST LOW 20 MIN: CPT | Mod: 25

## 2021-11-16 RX ORDER — CIPROFLOXACIN AND DEXAMETHASONE 3; 1 MG/ML; MG/ML
0.3-0.1 SUSPENSION/ DROPS AURICULAR (OTIC) TWICE DAILY
Qty: 1 | Refills: 3 | Status: DISCONTINUED | COMMUNITY
Start: 2021-07-13 | End: 2021-11-16

## 2021-11-16 NOTE — CONSULT LETTER
[Dear  ___] : Dear  [unfilled], [Courtesy Letter:] : I had the pleasure of seeing your patient, [unfilled], in my office today. [Please see my note below.] : Please see my note below. [Consult Closing:] : Thank you very much for allowing me to participate in the care of this patient.  If you have any questions, please do not hesitate to contact me. [Sincerely,] : Sincerely, [FreeTextEntry2] : Blue Mendiola DO ([place], [place]) [FreeTextEntry3] : Marcell Treviño MD, FACS\par Chief of Otolaryngology at [facility]\par \par Dept. of Otolaryngology\par [facility]

## 2021-11-16 NOTE — REASON FOR VISIT
[Subsequent Evaluation] : a subsequent evaluation for [Family Member] : family member [FreeTextEntry2] : Left external auditory canal BCCA

## 2021-11-16 NOTE — HISTORY OF PRESENT ILLNESS
[de-identified] : 76 year old female Left external auditory canal BCCA.\par History s/p Resection of left external auditory canal lesion with full-thickness skin graft reconstruction 6/23/21\par Prescribed drops, taking with relief, no longer taking\par States doing well since last visit, no issues or concerns, hearing has improved\par Denies otalgia, otorrhea, tinnitus, changes with hearing, dizziness, vertigo, headaches related to ears, recent fever sand ear infections

## 2021-11-16 NOTE — PHYSICAL EXAM
[de-identified] : L EAC fully healed.  No evidence of recuurrent tumor seen.  Cerumen filling EAC bilaterally.  Removed.   [Midline] : trachea located in midline position [Normal] : no rashes [FreeTextEntry2] : R temple scar

## 2021-12-21 ENCOUNTER — APPOINTMENT (OUTPATIENT)
Dept: OBGYN | Facility: HOSPITAL | Age: 76
End: 2021-12-21

## 2022-04-09 NOTE — ASU PATIENT PROFILE, ADULT - PREOP PAIN SCORE
SW/CM Discharge Plan  Informed patient is ready for discharge. Patient’s discharge destination is home, home therapy. Patient to be picked up by son.  Patient/interested person has been counseled for post hospitalization care.  Patient agrees and understands goals and plan. Initial implementation of the patient’s discharge plan has been arranged, including any devices/equipment needed for discharge. Discharge plan communicated to RN.    Informed patient of hospital discharge appeal rights under Medicare (IMM). Patient verbalizes understanding. Epic updated and has no concerns about discharge home as outlined above.     After Visit Summary - Transition Report Information  Receiving Agency/Facility: [facility]  Receiving Agency/Facility phone number: [phone]  Receiving Agency/Facility Type: Home Health/Hospice   0

## 2022-06-07 ENCOUNTER — APPOINTMENT (OUTPATIENT)
Dept: OTOLARYNGOLOGY | Facility: CLINIC | Age: 77
End: 2022-06-07
Payer: MEDICARE

## 2022-06-07 VITALS
HEART RATE: 71 BPM | BODY MASS INDEX: 23.56 KG/M2 | TEMPERATURE: 97.9 F | HEIGHT: 60 IN | SYSTOLIC BLOOD PRESSURE: 144 MMHG | WEIGHT: 120 LBS | DIASTOLIC BLOOD PRESSURE: 62 MMHG

## 2022-06-07 DIAGNOSIS — C44.201 UNSPECIFIED MALIGNANT NEOPLASM OF SKIN OF UNSPECIFIED EAR AND EXTERNAL AURICULAR CANAL: ICD-10-CM

## 2022-06-07 PROCEDURE — 99212 OFFICE O/P EST SF 10 MIN: CPT

## 2022-06-07 RX ORDER — FLUOCINOLONE ACETONIDE 0.11 MG/ML
0.01 OIL TOPICAL
Qty: 118 | Refills: 0 | Status: ACTIVE | COMMUNITY
Start: 2022-05-18

## 2022-06-07 RX ORDER — CLOBETASOL PROPIONATE 0.5 MG/G
0.05 OINTMENT TOPICAL
Qty: 60 | Refills: 0 | Status: ACTIVE | COMMUNITY
Start: 2022-05-18

## 2022-06-07 NOTE — REASON FOR VISIT
[Subsequent Evaluation] : a subsequent evaluation for [Family Member] : family member [FreeTextEntry2] : malignant neoplasm of skin of ear and EAC

## 2022-06-07 NOTE — PHYSICAL EXAM
[Midline] : trachea located in midline position [Normal] : no rashes [de-identified] : L EAC fully healed.  No evidence of recurrent tumor seen.   [FreeTextEntry2] : R temple scar

## 2022-06-07 NOTE — CONSULT LETTER
[Dear  ___] : Dear  [unfilled], [Sincerely,] : Sincerely, [Courtesy Letter:] : I had the pleasure of seeing your patient, [unfilled], in my office today. [Please see my note below.] : Please see my note below. [Consult Closing:] : Thank you very much for allowing me to participate in the care of this patient.  If you have any questions, please do not hesitate to contact me. [FreeTextEntry2] : Blue Mendiola MD ([place], [place]) [FreeTextEntry3] : Marcell Treviño MD, FACS\par Chief of Otolaryngology at [facility]\par \par Dept. of Otolaryngology\par [facility]

## 2022-06-07 NOTE — HISTORY OF PRESENT ILLNESS
[de-identified] : 76 year old woman, 6 month follow up for left external auditory canal BCCA\par History s/p resection of Left external auditory canal lesion with full thickness skin graft reconstruction 6/23/21\par Reports doing well since last visit, asymptomatic\par Denies pain, otalgia, discharge, drainage, bleeding, hearing changes, recent fever or ear infections

## 2022-10-03 DIAGNOSIS — Z98.890 OTHER SPECIFIED POSTPROCEDURAL STATES: ICD-10-CM

## 2022-10-03 DIAGNOSIS — Z87.891 PERSONAL HISTORY OF NICOTINE DEPENDENCE: ICD-10-CM

## 2022-10-03 DIAGNOSIS — Z86.39 PERSONAL HISTORY OF OTHER ENDOCRINE, NUTRITIONAL AND METABOLIC DISEASE: ICD-10-CM

## 2022-10-03 DIAGNOSIS — Z82.49 FAMILY HISTORY OF ISCHEMIC HEART DISEASE AND OTHER DISEASES OF THE CIRCULATORY SYSTEM: ICD-10-CM

## 2022-10-03 RX ORDER — PSYLLIUM HUSK 0.4 G
CAPSULE ORAL
Refills: 0 | Status: ACTIVE | COMMUNITY

## 2022-10-03 RX ORDER — MULTIVIT-MIN/FA/LYCOPEN/LUTEIN .4-300-25
TABLET ORAL
Refills: 0 | Status: ACTIVE | COMMUNITY

## 2022-10-11 ENCOUNTER — APPOINTMENT (OUTPATIENT)
Dept: PODIATRY | Facility: CLINIC | Age: 77
End: 2022-10-11

## 2022-10-11 VITALS — WEIGHT: 121 LBS | BODY MASS INDEX: 18.99 KG/M2 | HEIGHT: 67 IN

## 2022-10-11 DIAGNOSIS — M20.10 HALLUX VALGUS (ACQUIRED), UNSPECIFIED FOOT: ICD-10-CM

## 2022-10-11 DIAGNOSIS — M79.671 PAIN IN RIGHT FOOT: ICD-10-CM

## 2022-10-11 DIAGNOSIS — M79.672 PAIN IN RIGHT FOOT: ICD-10-CM

## 2022-10-11 PROCEDURE — 99212 OFFICE O/P EST SF 10 MIN: CPT

## 2022-10-14 PROBLEM — M79.671 PAIN IN BOTH FEET: Status: ACTIVE | Noted: 2022-10-12

## 2022-10-14 PROBLEM — M20.10 HALLUX VALGUS: Status: ACTIVE | Noted: 2022-10-12

## 2022-10-14 NOTE — ASSESSMENT
[FreeTextEntry1] : \par Treatment: She is certainly not a candidate for surgery. I discussed soaking with warm water and Epsom salt. I discussed memory foam shoes with extra width. I debrided mycotic nails. She will follow-up for evaluation as needed.

## 2022-10-14 NOTE — PHYSICAL EXAM
[Varicose Veins Of Lower Extremities] : present [0] : left foot posterior tibialis 0 [2+] : left foot dorsalis pedis 2+ [Sensation] : the sensory exam was normal to light touch and pinprick [No Focal Deficits] : no focal deficits [Deep Tendon Reflexes (DTR)] : deep tendon reflexes were 2+ and symmetric [Motor Exam] : the motor exam was normal [de-identified] : Painful arthritic bunions. the pain level is 2 or 3/10. They are stiff and prominent. [FreeTextEntry1] : 1st and 2nd toe onychomycosis. They are yellow, thick, brittle with subungual debris and mycosis. Pinched callus and bursitis at the bunion.

## 2022-10-14 NOTE — HISTORY OF PRESENT ILLNESS
[Sneakers] : maya [FreeTextEntry1] : Patient presents today for evaluation and care of painful HAV with bunion.They are getting more stiff and painful. She complains of pain at the big toe. She also has a secondary complaint of onychomycosis in nails 1 and 2 as well. Patient is under the care of Dr. Mendiola who she last saw 9/12/22.\par

## 2022-10-19 ENCOUNTER — APPOINTMENT (OUTPATIENT)
Dept: PODIATRY | Facility: CLINIC | Age: 77
End: 2022-10-19

## 2022-10-19 VITALS — BODY MASS INDEX: 19.15 KG/M2 | HEIGHT: 67 IN | WEIGHT: 122 LBS

## 2022-10-19 DIAGNOSIS — M79.604 PAIN IN RIGHT LEG: ICD-10-CM

## 2022-10-19 DIAGNOSIS — M79.671 PAIN IN RIGHT FOOT: ICD-10-CM

## 2022-10-19 DIAGNOSIS — M79.674 PAIN IN RIGHT LEG: ICD-10-CM

## 2022-10-19 DIAGNOSIS — M19.071 PRIMARY OSTEOARTHRITIS, RIGHT ANKLE AND FOOT: ICD-10-CM

## 2022-10-19 PROCEDURE — 99212 OFFICE O/P EST SF 10 MIN: CPT | Mod: 25

## 2022-10-19 PROCEDURE — 73630 X-RAY EXAM OF FOOT: CPT | Mod: RT

## 2022-10-19 PROCEDURE — 20600 DRAIN/INJ JOINT/BURSA W/O US: CPT | Mod: RT

## 2022-10-24 PROBLEM — M19.071 PRIMARY OSTEOARTHRITIS OF RIGHT FOOT: Status: ACTIVE | Noted: 2022-10-21

## 2022-10-24 PROBLEM — M79.671 RIGHT FOOT PAIN: Status: ACTIVE | Noted: 2022-10-21

## 2022-10-24 NOTE — HISTORY OF PRESENT ILLNESS
[Sneakers] : maya [FreeTextEntry1] : Patient presents today because of pain at the outside of her right foot. It has been going on for 2 to 3 days. She was very active, doing a lot of walking. She does not recall any trauma. She doesn't like to take anti-inflammatories. It is painful.  Patient last saw her PCP, Dr. Mendiola, on 9/12/22

## 2022-10-24 NOTE — ASSESSMENT
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 I gave her Compressogrip. She consented. I injected her under strict sterile technique with 1 1/2cc's of a combination of Xylocaine and Kenalog-40 at the cc join through a lateral approach right side after prepping with alcohol and using Ethyl chloride. She is going to ice for 10 minutes, for the next week. Call the office with any issues whatsoever. Shoes are orthopedic and adequate.

## 2022-10-24 NOTE — PHYSICAL EXAM
[Varicose Veins Of Lower Extremities] : present [0] : left foot posterior tibialis 0 [2+] : left foot dorsalis pedis 2+ [Sensation] : the sensory exam was normal to light touch and pinprick [No Focal Deficits] : no focal deficits [Deep Tendon Reflexes (DTR)] : deep tendon reflexes were 2+ and symmetric [Motor Exam] : the motor exam was normal [de-identified] : She has a lot of sensitivity at the cc joint. There is no pain over the subtalar joint, ankle joint. All tendons are noted to be intact.  [FreeTextEntry1] : Swelling about the lateral right rearfoot.

## 2022-10-24 NOTE — PROCEDURE
[FreeTextEntry1] : X-ray Report: (Right foot - 3 views)  X-rays demonstrate generalized osteoporosis throughout the foot and generalized ostearthritis especially at the cc joint. There is subchondral sclerosis, subchondral cyst and arthrosis. No sign of fracture.

## 2022-11-23 NOTE — HISTORY OF PRESENT ILLNESS
[de-identified] : 75 year old female  follow up s/p resection of Left ear canal tumor 6/23/21.  States completes the antibiotics tomorrow.  States having a little pain, every now and then has a sharp pain, resolves quickly.    Solaraze Counseling:  I discussed with the patient the risks of Solaraze including but not limited to erythema, scaling, itching, weeping, crusting, and pain.

## 2022-12-13 ENCOUNTER — APPOINTMENT (OUTPATIENT)
Dept: OTOLARYNGOLOGY | Facility: CLINIC | Age: 77
End: 2022-12-13

## 2023-01-10 ENCOUNTER — APPOINTMENT (OUTPATIENT)
Dept: PODIATRY | Facility: CLINIC | Age: 78
End: 2023-01-10
Payer: MEDICARE

## 2023-01-10 DIAGNOSIS — I70.91 GENERALIZED ATHEROSCLEROSIS: ICD-10-CM

## 2023-01-10 DIAGNOSIS — L85.1 ACQUIRED KERATOSIS [KERATODERMA] PALMARIS ET PLANTARIS: ICD-10-CM

## 2023-01-10 PROCEDURE — 11721 DEBRIDE NAIL 6 OR MORE: CPT | Mod: 59

## 2023-01-10 PROCEDURE — 11056 PARNG/CUTG B9 HYPRKR LES 2-4: CPT

## 2023-01-11 PROBLEM — I70.91 GENERALIZED ATHEROSCLEROSIS: Status: ACTIVE | Noted: 2022-10-03

## 2023-01-13 PROBLEM — L85.1 KERATODERMA, ACQUIRED: Status: ACTIVE | Noted: 2023-01-11

## 2023-01-13 NOTE — ASSESSMENT
[FreeTextEntry1] : \par Impression: Keratosis. Onychomycosis. PVD. Pain.\par \par Treatment: I enucleated keratotic lesions x at least 2. I aggressively debrided and debulked mycotic nails x8 using a small straight nail splitter and rotary [place]. I applied Naftin gel. Her shoes are orthopedic and adequate. She will follow-up in the office as needed.

## 2023-01-13 NOTE — PHYSICAL EXAM
[de-identified] : Arthritic bunions. [FreeTextEntry1] : IPK left sub 2, right sub 5. Onychomycosis in most nails but the ones that are painful are 1, 2, 3 and 4 on the right and 1, 2, 3 and 5 on the left. They are yellow, thick, brittle with subungual debris and mycosis.

## 2023-04-11 ENCOUNTER — APPOINTMENT (OUTPATIENT)
Dept: PODIATRY | Facility: CLINIC | Age: 78
End: 2023-04-11
Payer: MEDICARE

## 2023-04-11 DIAGNOSIS — M20.40 OTHER HAMMER TOE(S) (ACQUIRED), UNSPECIFIED FOOT: ICD-10-CM

## 2023-04-11 PROCEDURE — 99213 OFFICE O/P EST LOW 20 MIN: CPT

## 2023-04-12 ENCOUNTER — APPOINTMENT (OUTPATIENT)
Dept: PODIATRY | Facility: CLINIC | Age: 78
End: 2023-04-12

## 2023-04-17 PROBLEM — M20.40 HAMMERTOE: Status: ACTIVE | Noted: 2023-04-14

## 2023-04-17 NOTE — HISTORY OF PRESENT ILLNESS
[Sneakers] : maya [FreeTextEntry1] : Patient presents today because of a right 2nd hammertoe that is getting worse; it is causing pain, it is red and irritated but she also has a bunion.

## 2023-04-17 NOTE — ASSESSMENT
[FreeTextEntry1] : Impression: Hammertoe (M20.40).  Pain (M79).\par \par Treatment: I enucleated the keratotic lesion.  I gave her tube foam protectors.  I discussed soaking and extra width shoe gear.  \par I gave her the [employer] to get hammertoe protectors.  She is asking about surgery but that would involve fixing the bunion and hammertoe and that is something she is not interested in.\par I debrided her mycotic nails.  \par Return: As needed.

## 2023-04-17 NOTE — PHYSICAL EXAM
[FreeTextEntry1] : Right 2nd toe bunion/bursitis and keratosis with inflammation and sensitivity.   [de-identified] : HAV with bunion, right side with arthritic 2nd hammertoe, moderate deformity with semi-rigid arthritic contracture.

## 2023-05-03 ENCOUNTER — APPOINTMENT (OUTPATIENT)
Dept: UROGYNECOLOGY | Facility: CLINIC | Age: 78
End: 2023-05-03
Payer: MEDICARE

## 2023-05-03 VITALS
DIASTOLIC BLOOD PRESSURE: 68 MMHG | BODY MASS INDEX: 24.35 KG/M2 | HEIGHT: 60 IN | HEART RATE: 84 BPM | SYSTOLIC BLOOD PRESSURE: 157 MMHG | WEIGHT: 124 LBS

## 2023-05-03 DIAGNOSIS — Z82.49 FAMILY HISTORY OF ISCHEMIC HEART DISEASE AND OTHER DISEASES OF THE CIRCULATORY SYSTEM: ICD-10-CM

## 2023-05-03 DIAGNOSIS — N81.4 UTEROVAGINAL PROLAPSE, UNSPECIFIED: ICD-10-CM

## 2023-05-03 DIAGNOSIS — N81.2 INCOMPLETE UTEROVAGINAL PROLAPSE: ICD-10-CM

## 2023-05-03 DIAGNOSIS — N81.11 CYSTOCELE, MIDLINE: ICD-10-CM

## 2023-05-03 DIAGNOSIS — R39.15 URGENCY OF URINATION: ICD-10-CM

## 2023-05-03 LAB
BILIRUB UR QL STRIP: NORMAL
CLARITY UR: CLEAR
COLLECTION METHOD: NORMAL
GLUCOSE UR-MCNC: NORMAL
HCG UR QL: 0.2 EU/DL
HGB UR QL STRIP.AUTO: NORMAL
KETONES UR-MCNC: NORMAL
LEUKOCYTE ESTERASE UR QL STRIP: NORMAL
NITRITE UR QL STRIP: NORMAL
PH UR STRIP: 6.5
PROT UR STRIP-MCNC: NORMAL
SP GR UR STRIP: 1.02

## 2023-05-03 PROCEDURE — 99204 OFFICE O/P NEW MOD 45 MIN: CPT | Mod: 25

## 2023-05-03 PROCEDURE — 51701 INSERT BLADDER CATHETER: CPT

## 2023-05-03 NOTE — DISCUSSION/SUMMARY
[FreeTextEntry1] : \par We reviewed management options for her prolapse including: observation, pelvic floor exercises, pessary, and surgical management. She denies bothersome symptoms due to her prolapse and PVR normal. She would like to continue with observation. Urine sent for micro UA and culture. Will RTO in 1 year for follow up, or sooner if issues arise. All questions answered.

## 2023-05-03 NOTE — HISTORY OF PRESENT ILLNESS
[Rectal Prolapse] : mild [Unable To Restrain Bowel Movement] : no [Urinary Frequency] : no [Pain During Urination (Dysuria)] : no [Urinary Tract Infection] : mild [] : years ago [FreeTextEntry5] : denies bother [de-identified] : mild [FreeTextEntry1] : \par Marianela presents for consultation for pelvic organ prolapse. She was previously seen by me in 2019 and found to have uterovaginal prolapse. At that time she denied bothersome symptoms and opted for observation. Today she reports she desires a check up. No new issues. She denies obstructed voiding, pain from prolapse or effect on QOL. She is very active and dances weekly, without issues from prolapse. \par \par h/o abdominal "bladder lift" ~17 years ago, unsure which procedure was performed

## 2023-05-03 NOTE — PHYSICAL EXAM
[Chaperone Present] : A chaperone was present in the examining room during all aspects of the physical examination [No Acute Distress] : in no acute distress [Oriented x3] : oriented to person, place, and time [Normal Memory] : ~T memory was ~L unimpaired [Normal Mood/Affect] : mood and affect are normal [Anxiety] : patient is anxious [Warm and Dry] : was warm and dry to touch [Normal Gait] : gait was normal [Vulvar Atrophy] : vulvar atrophy [Labia Majora] : were normal [Labia Minora] : were normal [No Bleeding] : there was no active vaginal bleeding [Aa ____] : Aa [unfilled] [Ba ____] : Ba [unfilled] [GH ____] : GH [unfilled] [PB ____] : PB [unfilled] [TVL ____] : TVL  [unfilled] [Ap ____] : Ap [unfilled] [Bp ____] : Bp [unfilled] [D ____] : D [unfilled] [Normal] : no abnormalities [Atrophy] : atrophy [C ____] : C [unfilled] [Exam Deferred] : was deferred [Tenderness] : ~T no ~M abdominal tenderness observed [Distended] : not distended [Inguinal LAD] : no adenopathy was noted in the inguinal lymph nodes [FreeTextEntry4] : defect in proximal anterior vaginal wall. Unchanged from 2019. No enterocele or bowel through defect. No peritoneal defect.  [de-identified] : u

## 2023-05-05 PROBLEM — Z82.49 FAMILY HISTORY OF RHEUMATIC FEVER: Status: ACTIVE | Noted: 2023-05-05

## 2023-05-05 PROBLEM — N81.4 UTEROVAGINAL PROLAPSE: Status: RESOLVED | Noted: 2023-05-05 | Resolved: 2023-05-05

## 2023-05-06 LAB
APPEARANCE: ABNORMAL
BACTERIA: NEGATIVE /HPF
BILIRUBIN URINE: NEGATIVE
BLOOD URINE: ABNORMAL
CAST: 1 /LPF
COLOR: YELLOW
EPITHELIAL CELLS: 0 /HPF
GLUCOSE QUALITATIVE U: NEGATIVE MG/DL
KETONES URINE: NEGATIVE MG/DL
LEUKOCYTE ESTERASE URINE: ABNORMAL
MICROSCOPIC-UA: NORMAL
NITRITE URINE: NEGATIVE
PH URINE: 6.5
PROTEIN URINE: NORMAL MG/DL
RED BLOOD CELLS URINE: 1 /HPF
SPECIFIC GRAVITY URINE: 1.01
UROBILINOGEN URINE: 0.2 MG/DL
WHITE BLOOD CELLS URINE: 831 /HPF

## 2023-07-11 ENCOUNTER — APPOINTMENT (OUTPATIENT)
Dept: PODIATRY | Facility: CLINIC | Age: 78
End: 2023-07-11
Payer: MEDICARE

## 2023-07-11 PROCEDURE — 11721 DEBRIDE NAIL 6 OR MORE: CPT

## 2023-07-14 NOTE — ASSESSMENT
[FreeTextEntry1] : \par Impression: Onychomycosis. Pain.\par \par Treatment: I aggressively debrided and debulked painful mycotic nails with a small straight nail splitter and rotary [place]er[place] x10. I applied Naftin gel and recommended she uses antifungal powder. Sleep without socks on. I also debrided keratotic lesions without incident. She will follow-up in the office as needed.

## 2023-07-14 NOTE — HISTORY OF PRESENT ILLNESS
[Sneakers] : maya [FreeTextEntry1] : Patient presents today because of painful onychomycotic nails that are getting worse and cause her a lot of grief and sensitivity.  She cannot care for it herself.

## 2023-07-14 NOTE — PHYSICAL EXAM
[Varicose Veins Of Lower Extremities] : present [0] : left foot posterior tibialis 0 [2+] : left foot dorsalis pedis 2+ [Sensation] : the sensory exam was normal to light touch and pinprick [No Focal Deficits] : no focal deficits [Deep Tendon Reflexes (DTR)] : deep tendon reflexes were 2+ and symmetric [Motor Exam] : the motor exam was normal [de-identified] : Metatarsalgia. Painful flatfoot deformity.  [FreeTextEntry1] : She has fungus in almost all nails but the ones that are painful are 1, 2, 3, 4 and 5 on the right and 1 and 2 on the left. They are end-stage yellow, chalky, brittle with subungual debris and mycosis. Painful at the tips and tops. She also has multiple submetatarsal keratotic lesions bilateral.

## 2023-10-12 ENCOUNTER — APPOINTMENT (OUTPATIENT)
Dept: PODIATRY | Facility: CLINIC | Age: 78
End: 2023-10-12
Payer: MEDICARE

## 2023-10-12 DIAGNOSIS — B35.3 TINEA PEDIS: ICD-10-CM

## 2023-10-12 PROCEDURE — 99212 OFFICE O/P EST SF 10 MIN: CPT

## 2023-10-12 RX ORDER — KETOCONAZOLE 20 MG/G
2 CREAM TOPICAL DAILY
Qty: 1 | Refills: 3 | Status: ACTIVE | COMMUNITY
Start: 2023-10-12 | End: 1900-01-01

## 2023-10-18 PROBLEM — B35.3 TINEA PEDIS OF BOTH FEET: Status: ACTIVE | Noted: 2023-10-16

## 2023-12-17 ENCOUNTER — NON-APPOINTMENT (OUTPATIENT)
Age: 78
End: 2023-12-17

## 2024-01-11 ENCOUNTER — APPOINTMENT (OUTPATIENT)
Dept: PODIATRY | Facility: CLINIC | Age: 79
End: 2024-01-11
Payer: MEDICARE

## 2024-01-11 PROCEDURE — 11720 DEBRIDE NAIL 1-5: CPT

## 2024-01-12 NOTE — ASSESSMENT
[FreeTextEntry1] : Impression: Onychomycosis. Pain.  Treatment: I aggressively debrided and debulked painful mycotic nails x5 using a small straight nail splitter and rotary [place] so she is comfortable in shoes. Patient is to continue Ketoconazole. Follow-up as needed.

## 2024-01-12 NOTE — PHYSICAL EXAM
[Varicose Veins Of Lower Extremities] : present [Delayed in the Right Toes] : capillary refills delayed in the right toes [Delayed in the Left Toes] : capillary refills delayed in the left toes [0] : left foot posterior tibialis 0 [2+] : left foot dorsalis pedis 2+ [Sensation] : the sensory exam was normal to light touch and pinprick [No Focal Deficits] : no focal deficits [Deep Tendon Reflexes (DTR)] : deep tendon reflexes were 2+ and symmetric [Motor Exam] : the motor exam was normal [de-identified] : Metatarsalgia. Painful flatfoot deformity.  [FreeTextEntry1] : Onychomycotic nails 1 and 2 left and 1, 2, 3 on the right. They are end-stage, yellow, brittle, thickened, chalky with subungual debris. The hallux nails are painful at the tips and tops and tibial borders. The 2nd toenail bilateral and 3rd on the right are painful at the tip.

## 2024-01-12 NOTE — HISTORY OF PRESENT ILLNESS
[FreeTextEntry1] : Patient presents today for evaluation of deformed onychomycotic nails that are getting worse, 1 and 2 left and 1, 2, 3 on the right. they are yellow, thick, brittle with subungual debris. They are getting much worse, more painful and difficulty in shoe gear.

## 2024-04-15 ENCOUNTER — APPOINTMENT (OUTPATIENT)
Dept: PODIATRY | Facility: CLINIC | Age: 79
End: 2024-04-15
Payer: MEDICARE

## 2024-04-15 DIAGNOSIS — Q82.8 OTHER SPECIFIED CONGENITAL MALFORMATIONS OF SKIN: ICD-10-CM

## 2024-04-15 DIAGNOSIS — M79.674 PAIN IN RIGHT TOE(S): ICD-10-CM

## 2024-04-15 DIAGNOSIS — M79.675 PAIN IN RIGHT TOE(S): ICD-10-CM

## 2024-04-15 DIAGNOSIS — B35.1 TINEA UNGUIUM: ICD-10-CM

## 2024-04-15 PROCEDURE — 11720 DEBRIDE NAIL 1-5: CPT

## 2024-04-17 PROBLEM — Q82.8 PLANTAR KERATOSIS: Status: ACTIVE | Noted: 2022-10-03

## 2024-04-17 PROBLEM — M79.674 PAIN IN TOES OF BOTH FEET: Status: ACTIVE | Noted: 2023-01-11

## 2024-04-17 PROBLEM — B35.1 ONYCHOMYCOSIS: Status: ACTIVE | Noted: 2022-10-03

## 2024-04-17 NOTE — ASSESSMENT
[FreeTextEntry1] : Impression: Onychomycosis. Pain.  Treatment: I aggressively debrided and debulked painful mycotic nails x6 using a small straight nail splitter and rotary [place] so she is comfortable in shoes. I applied Naftin gel. I encouraged her to use antifungal powder and encouraged her to walk as much as possible. I also prepped the area with alcohol and trimmed bilateral plantar foot keratotic lesions.

## 2024-04-17 NOTE — HISTORY OF PRESENT ILLNESS
[FreeTextEntry1] : Patient presents today with end-stage deformed mycotic nails 1, 2, 3 bilateral. They are yellow, thick, brittle with subungual debris and mycosis. They are dystrophic, discolored, deformed and painful at the tops and tips of each nail.

## 2024-04-17 NOTE — PHYSICAL EXAM
[Varicose Veins Of Lower Extremities] : present [Delayed in the Right Toes] : capillary refills delayed in the right toes [Delayed in the Left Toes] : capillary refills delayed in the left toes [0] : left foot posterior tibialis 0 [2+] : left foot dorsalis pedis 2+ [de-identified] : Flatfoot deformity. [FreeTextEntry1] : Onychomycotic nails 1, 2 and 3 left and 1, 2, 3 on the right. They are end-stage, yellow, brittle, thickened, chalky with subungual debris. They are painful at the tops and tips. Plantar keratosis. [Sensation] : the sensory exam was normal to light touch and pinprick [No Focal Deficits] : no focal deficits [Deep Tendon Reflexes (DTR)] : deep tendon reflexes were 2+ and symmetric [Motor Exam] : the motor exam was normal [Diminished Throughout Right Foot] : normal sensation with monofilament testing throughout right foot [Diminished Throughout Left Foot] : normal sensation with monofilament testing throughout left foot

## 2024-07-23 ENCOUNTER — APPOINTMENT (OUTPATIENT)
Dept: PODIATRY | Facility: CLINIC | Age: 79
End: 2024-07-23

## 2024-07-23 DIAGNOSIS — M79.674 PAIN IN RIGHT TOE(S): ICD-10-CM

## 2024-07-23 DIAGNOSIS — M79.675 PAIN IN RIGHT TOE(S): ICD-10-CM

## 2024-07-23 DIAGNOSIS — B35.1 TINEA UNGUIUM: ICD-10-CM

## 2024-07-23 PROCEDURE — 11720 DEBRIDE NAIL 1-5: CPT

## 2024-07-29 NOTE — PHYSICAL EXAM
[Varicose Veins Of Lower Extremities] : present [Delayed in the Right Toes] : capillary refills delayed in the right toes [Delayed in the Left Toes] : capillary refills delayed in the left toes [0] : left foot posterior tibialis 0 [2+] : left foot dorsalis pedis 2+ [Sensation] : the sensory exam was normal to light touch and pinprick [No Focal Deficits] : no focal deficits [Deep Tendon Reflexes (DTR)] : deep tendon reflexes were 2+ and symmetric [Motor Exam] : the motor exam was normal [de-identified] : Flatfoot deformity. [FreeTextEntry1] : Onychomycotic nails 1, 2 and 3 left and 1, 2, 3 on the right. They are end-stage, yellow, brittle, thickened, chalky with subungual debris. They are painful at the tops and tips.  [Diminished Throughout Right Foot] : normal sensation with monofilament testing throughout right foot [Diminished Throughout Left Foot] : normal sensation with monofilament testing throughout left foot

## 2024-07-29 NOTE — PHYSICAL EXAM
[Varicose Veins Of Lower Extremities] : present [Delayed in the Right Toes] : capillary refills delayed in the right toes [Delayed in the Left Toes] : capillary refills delayed in the left toes [0] : left foot posterior tibialis 0 [2+] : left foot dorsalis pedis 2+ [Sensation] : the sensory exam was normal to light touch and pinprick [No Focal Deficits] : no focal deficits [Deep Tendon Reflexes (DTR)] : deep tendon reflexes were 2+ and symmetric [Motor Exam] : the motor exam was normal [de-identified] : Flatfoot deformity. [FreeTextEntry1] : Onychomycotic nails 1, 2 and 3 left and 1, 2, 3 on the right. They are end-stage, yellow, brittle, thickened, chalky with subungual debris. They are painful at the tops and tips.  [Diminished Throughout Right Foot] : normal sensation with monofilament testing throughout right foot [Diminished Throughout Left Foot] : normal sensation with monofilament testing throughout left foot

## 2024-07-29 NOTE — PHYSICAL EXAM
[Varicose Veins Of Lower Extremities] : present [Delayed in the Right Toes] : capillary refills delayed in the right toes [Delayed in the Left Toes] : capillary refills delayed in the left toes [0] : left foot posterior tibialis 0 [2+] : left foot dorsalis pedis 2+ [Sensation] : the sensory exam was normal to light touch and pinprick [No Focal Deficits] : no focal deficits [Deep Tendon Reflexes (DTR)] : deep tendon reflexes were 2+ and symmetric [Motor Exam] : the motor exam was normal [de-identified] : Flatfoot deformity. [FreeTextEntry1] : Onychomycotic nails 1, 2 and 3 left and 1, 2, 3 on the right. They are end-stage, yellow, brittle, thickened, chalky with subungual debris. They are painful at the tops and tips.  [Diminished Throughout Right Foot] : normal sensation with monofilament testing throughout right foot [Diminished Throughout Left Foot] : normal sensation with monofilament testing throughout left foot

## 2024-07-29 NOTE — HISTORY OF PRESENT ILLNESS
[FreeTextEntry1] : Patient presents today complaining of painful, thickened, mycotic nails that she cannot care for herself. They cause her pain and difficulty in shoe gear. The patient's history and physical has been reviewed and verified with no changes.

## 2024-07-29 NOTE — ASSESSMENT
[FreeTextEntry1] : Impression: Onychomycosis. Pain.  Treatment: I aggressively debrided and debulked painful mycotic nails x6 using a small straight nail splitter and rotary [place] so she is comfortable in shoes. I applied Naftin gel. I encouraged her to use antifungal powder and encouraged her to walk as much as possible. Follow-up in 3 months.

## 2024-07-29 NOTE — PHYSICAL EXAM
[Varicose Veins Of Lower Extremities] : present [Delayed in the Right Toes] : capillary refills delayed in the right toes [Delayed in the Left Toes] : capillary refills delayed in the left toes [0] : left foot posterior tibialis 0 [2+] : left foot dorsalis pedis 2+ [Sensation] : the sensory exam was normal to light touch and pinprick [No Focal Deficits] : no focal deficits [Deep Tendon Reflexes (DTR)] : deep tendon reflexes were 2+ and symmetric [Motor Exam] : the motor exam was normal [de-identified] : Flatfoot deformity. [FreeTextEntry1] : Onychomycotic nails 1, 2 and 3 left and 1, 2, 3 on the right. They are end-stage, yellow, brittle, thickened, chalky with subungual debris. They are painful at the tops and tips.  [Diminished Throughout Right Foot] : normal sensation with monofilament testing throughout right foot [Diminished Throughout Left Foot] : normal sensation with monofilament testing throughout left foot

## 2024-08-19 ENCOUNTER — APPOINTMENT (OUTPATIENT)
Dept: PODIATRY | Facility: CLINIC | Age: 79
End: 2024-08-19
Payer: MEDICARE

## 2024-08-19 DIAGNOSIS — L97.819 VARICOSE VEINS OF RIGHT LOWER EXTREMITY WITH BOTH ULCER OF OTHER PART OF LOWER EXTREMITY AND INFLAMMATION: ICD-10-CM

## 2024-08-19 DIAGNOSIS — I83.218 VARICOSE VEINS OF RIGHT LOWER EXTREMITY WITH BOTH ULCER OF OTHER PART OF LOWER EXTREMITY AND INFLAMMATION: ICD-10-CM

## 2024-08-19 PROCEDURE — 99212 OFFICE O/P EST SF 10 MIN: CPT

## 2024-08-22 PROBLEM — I83.218: Status: ACTIVE | Noted: 2024-08-21

## 2024-08-22 NOTE — ASSESSMENT
[FreeTextEntry1] : Impression: R/O DVT.  Treatment: I explained to the patient that she should get a venous ultrasound to R/O DVT. I gave her a place to go, local out-patient, otherwise go to the Emergency Room. I explained that in detail. She is going to get help to get that done. I stressed the importance of getting the ultrasound test done. I gave her the name of [facility] down the block where she could have the venous duplex done and gave her the address, or go to the Emergency Room.

## 2024-08-22 NOTE — HISTORY OF PRESENT ILLNESS
[FreeTextEntry1] : Patient presents today because of swelling at her right leg. She notices it like that for weeks.

## 2024-08-22 NOTE — PHYSICAL EXAM
[Ankle Swelling (On Exam)] : present [Varicose Veins Of Lower Extremities] : present [Delayed in the Right Toes] : capillary refills delayed in the right toes [Delayed in the Left Toes] : capillary refills delayed in the left toes [0] : left foot posterior tibialis 0 [2+] : left foot dorsalis pedis 2+ [Sensation] : the sensory exam was normal to light touch and pinprick [No Focal Deficits] : no focal deficits [Deep Tendon Reflexes (DTR)] : deep tendon reflexes were 2+ and symmetric [Motor Exam] : the motor exam was normal [de-identified] : Flatfoot deformity. [FreeTextEntry1] : Onychomycotic nails 1, 2 and 3 left and 1, 2, 3 on the right. They are end-stage, yellow, brittle, thickened, chalky with subungual debris.  [Diminished Throughout Right Foot] : normal sensation with monofilament testing throughout right foot [Diminished Throughout Left Foot] : normal sensation with monofilament testing throughout left foot

## 2024-08-22 NOTE — PHYSICAL EXAM
[Ankle Swelling (On Exam)] : present [Varicose Veins Of Lower Extremities] : present [Delayed in the Right Toes] : capillary refills delayed in the right toes [Delayed in the Left Toes] : capillary refills delayed in the left toes [0] : left foot posterior tibialis 0 [2+] : left foot dorsalis pedis 2+ [Sensation] : the sensory exam was normal to light touch and pinprick [No Focal Deficits] : no focal deficits [Deep Tendon Reflexes (DTR)] : deep tendon reflexes were 2+ and symmetric [Motor Exam] : the motor exam was normal [de-identified] : Flatfoot deformity. [FreeTextEntry1] : Onychomycotic nails 1, 2 and 3 left and 1, 2, 3 on the right. They are end-stage, yellow, brittle, thickened, chalky with subungual debris.  [Diminished Throughout Right Foot] : normal sensation with monofilament testing throughout right foot [Diminished Throughout Left Foot] : normal sensation with monofilament testing throughout left foot

## 2024-08-22 NOTE — PHYSICAL EXAM
[Ankle Swelling (On Exam)] : present [Varicose Veins Of Lower Extremities] : present [Delayed in the Right Toes] : capillary refills delayed in the right toes [Delayed in the Left Toes] : capillary refills delayed in the left toes [0] : left foot posterior tibialis 0 [2+] : left foot dorsalis pedis 2+ [Sensation] : the sensory exam was normal to light touch and pinprick [No Focal Deficits] : no focal deficits [Deep Tendon Reflexes (DTR)] : deep tendon reflexes were 2+ and symmetric [Motor Exam] : the motor exam was normal [de-identified] : Flatfoot deformity. [FreeTextEntry1] : Onychomycotic nails 1, 2 and 3 left and 1, 2, 3 on the right. They are end-stage, yellow, brittle, thickened, chalky with subungual debris.  [Diminished Throughout Right Foot] : normal sensation with monofilament testing throughout right foot [Diminished Throughout Left Foot] : normal sensation with monofilament testing throughout left foot

## 2024-08-22 NOTE — ASSESSMENT
[FreeTextEntry1] : Impression: R/O DVT.  Treatment: I explained to the patient that she should get a venous ultrasound to R/O DVT. I gave her a place to go, local out-patient, otherwise go to the Emergency Room. I explained that in detail. She is going to get help to get that done. I stressed the importance of getting the ultrasound test done. I gave her the name of [facility] down the block where she could have the venous duplex done and gave her the address, or go to the Emergency Room.

## 2024-08-22 NOTE — ASSESSMENT
[FreeTextEntry1] : Impression: R/O DVT.  Treatment: I explained to the patient that she should get a venous ultrasound to R/O DVT. I gave her a place to go, local out-patient, otherwise go to the Emergency Room. I explained that in detail. She is going to get help to get that done. I stressed the importance of getting the ultrasound test done. I gave her the name of [facility] down the block where she could have the venous duplex done and gave her the address, or go to the Emergency Room.

## 2024-10-22 ENCOUNTER — APPOINTMENT (OUTPATIENT)
Dept: PODIATRY | Facility: CLINIC | Age: 79
End: 2024-10-22

## 2024-10-30 ENCOUNTER — APPOINTMENT (OUTPATIENT)
Dept: PODIATRY | Facility: CLINIC | Age: 79
End: 2024-10-30

## 2025-03-25 ENCOUNTER — APPOINTMENT (OUTPATIENT)
Dept: PODIATRY | Facility: CLINIC | Age: 80
End: 2025-03-25

## 2025-03-25 DIAGNOSIS — B35.1 TINEA UNGUIUM: ICD-10-CM

## 2025-03-25 DIAGNOSIS — M79.674 PAIN IN RIGHT TOE(S): ICD-10-CM

## 2025-03-25 DIAGNOSIS — M79.675 PAIN IN RIGHT TOE(S): ICD-10-CM

## 2025-03-25 PROCEDURE — 11720 DEBRIDE NAIL 1-5: CPT

## 2025-06-20 ENCOUNTER — APPOINTMENT (OUTPATIENT)
Dept: PODIATRY | Facility: CLINIC | Age: 80
End: 2025-06-20

## 2025-06-20 PROCEDURE — 11056 PARNG/CUTG B9 HYPRKR LES 2-4: CPT | Mod: Q8

## 2025-06-20 PROCEDURE — 11721 DEBRIDE NAIL 6 OR MORE: CPT | Mod: Q8,59
